# Patient Record
Sex: MALE | Race: WHITE | Employment: FULL TIME | URBAN - METROPOLITAN AREA
[De-identification: names, ages, dates, MRNs, and addresses within clinical notes are randomized per-mention and may not be internally consistent; named-entity substitution may affect disease eponyms.]

---

## 2023-09-08 ENCOUNTER — HOSPITAL ENCOUNTER (EMERGENCY)
Age: 23
Discharge: HOME | End: 2023-09-08
Attending: EMERGENCY MEDICINE
Payer: COMMERCIAL

## 2023-09-08 VITALS
HEART RATE: 83 BPM | TEMPERATURE: 98.1 F | SYSTOLIC BLOOD PRESSURE: 119 MMHG | HEIGHT: 72 IN | RESPIRATION RATE: 16 BRPM | WEIGHT: 153 LBS | DIASTOLIC BLOOD PRESSURE: 80 MMHG | BODY MASS INDEX: 20.72 KG/M2 | OXYGEN SATURATION: 99 %

## 2023-09-08 DIAGNOSIS — U07.1 COVID-19: Primary | ICD-10-CM

## 2023-09-08 LAB — SARS-COV-2 RDRP RESP QL NAA+PROBE: DETECTED

## 2023-09-08 PROCEDURE — 99283 EMERGENCY DEPT VISIT LOW MDM: CPT

## 2023-09-08 PROCEDURE — 87635 SARS-COV-2 COVID-19 AMP PRB: CPT

## 2023-09-08 ASSESSMENT — ENCOUNTER SYMPTOMS
ABDOMINAL DISTENTION: 0
ABDOMINAL PAIN: 0
BACK PAIN: 0
COUGH: 1
SORE THROAT: 0
SHORTNESS OF BREATH: 0

## 2023-09-08 ASSESSMENT — PAIN SCALES - GENERAL: PAINLEVEL_OUTOF10: 0

## 2023-09-08 ASSESSMENT — PAIN - FUNCTIONAL ASSESSMENT: PAIN_FUNCTIONAL_ASSESSMENT: 0-10

## 2023-09-08 NOTE — ED NOTES
Pt discharged at this time. Discharge instructions and medications reviewed,  Questions were answered. PT verbalized understanding. VSS, Afebrile. Follow up appointments were discussed.          [place], [place]  09/08/23 1723

## 2023-09-08 NOTE — DISCHARGE INSTRUCTIONS
You have COVID. You need to isolate from 5 days from onset of symptoms. That would be Monday. You can return to work but you must wear a mask for an additional 5 days after that. Lots of fluids and rest.  Ibuprofen as needed.   Seek medical attention immediately if you develop shortness of breath or have any concerns

## 2023-09-08 NOTE — ED TRIAGE NOTES
Pt. C/o nasal congestion and headache onset Tuesday evening, not able to work, needs work note to return tomorrow